# Patient Record
Sex: FEMALE | Race: WHITE | Employment: OTHER | URBAN - NONMETROPOLITAN AREA
[De-identification: names, ages, dates, MRNs, and addresses within clinical notes are randomized per-mention and may not be internally consistent; named-entity substitution may affect disease eponyms.]

---

## 2017-01-09 ENCOUNTER — TELEPHONE (OUTPATIENT)
Dept: CARDIOLOGY | Age: 77
End: 2017-01-09

## 2017-02-27 ENCOUNTER — OFFICE VISIT (OUTPATIENT)
Dept: CARDIOLOGY | Age: 77
End: 2017-02-27
Payer: MEDICARE

## 2017-02-27 ENCOUNTER — TELEPHONE (OUTPATIENT)
Dept: CARDIOLOGY | Age: 77
End: 2017-02-27

## 2017-02-27 VITALS
HEIGHT: 65 IN | HEART RATE: 74 BPM | SYSTOLIC BLOOD PRESSURE: 116 MMHG | DIASTOLIC BLOOD PRESSURE: 80 MMHG | BODY MASS INDEX: 24.83 KG/M2 | WEIGHT: 149 LBS

## 2017-02-27 DIAGNOSIS — I44.7 LEFT BUNDLE BRANCH BLOCK (LBBB) ON ELECTROCARDIOGRAM: ICD-10-CM

## 2017-02-27 DIAGNOSIS — R60.9 FLUID RETENTION: ICD-10-CM

## 2017-02-27 DIAGNOSIS — R00.2 PALPITATIONS: Primary | ICD-10-CM

## 2017-02-27 DIAGNOSIS — Z85.79 HISTORY OF LYMPHOMA: ICD-10-CM

## 2017-02-27 DIAGNOSIS — M79.10 MYALGIA: ICD-10-CM

## 2017-02-27 PROCEDURE — 93000 ELECTROCARDIOGRAM COMPLETE: CPT | Performed by: NURSE PRACTITIONER

## 2017-02-27 PROCEDURE — 99214 OFFICE O/P EST MOD 30 MIN: CPT | Performed by: NURSE PRACTITIONER

## 2017-02-27 RX ORDER — ESOMEPRAZOLE MAGNESIUM 40 MG/1
40 CAPSULE, DELAYED RELEASE ORAL
COMMUNITY
Start: 2017-02-24

## 2017-02-27 RX ORDER — ZOLPIDEM TARTRATE 10 MG/1
5 TABLET ORAL NIGHTLY PRN
COMMUNITY
Start: 2017-01-18

## 2017-02-27 RX ORDER — BUSPIRONE HYDROCHLORIDE 15 MG/1
15 TABLET ORAL 2 TIMES DAILY
COMMUNITY
Start: 2016-12-01 | End: 2020-02-18

## 2017-02-27 RX ORDER — LORATADINE 10 MG/1
10 TABLET ORAL DAILY
COMMUNITY
Start: 2016-12-01

## 2017-02-28 PROBLEM — Z85.79 HISTORY OF LYMPHOMA: Status: ACTIVE | Noted: 2017-02-28

## 2017-02-28 PROBLEM — M79.10 MYALGIA: Status: ACTIVE | Noted: 2017-02-28

## 2017-02-28 PROBLEM — R60.9 FLUID RETENTION: Status: ACTIVE | Noted: 2017-02-28

## 2017-03-02 ENCOUNTER — HOSPITAL ENCOUNTER (OUTPATIENT)
Dept: WOMENS IMAGING | Age: 77
Discharge: HOME | End: 2017-03-02
Payer: MEDICARE

## 2017-03-02 ENCOUNTER — TRANSCRIBE ORDERS (OUTPATIENT)
Dept: ADMINISTRATIVE | Facility: HOSPITAL | Age: 77
End: 2017-03-02

## 2017-03-02 DIAGNOSIS — D05.12 INTRADUCTAL CARCINOMA IN SITU OF LEFT BREAST: Primary | ICD-10-CM

## 2017-03-02 DIAGNOSIS — R92.8 ABNORMAL MAMMOGRAM, UNSPECIFIED: ICD-10-CM

## 2017-03-02 PROCEDURE — G0206 DX MAMMO INCL CAD UNI: HCPCS

## 2017-03-14 ENCOUNTER — OFFICE VISIT (OUTPATIENT)
Dept: SURGERY | Age: 77
End: 2017-03-14
Payer: MEDICARE

## 2017-03-14 VITALS
HEIGHT: 65 IN | WEIGHT: 145 LBS | BODY MASS INDEX: 24.16 KG/M2 | HEART RATE: 84 BPM | SYSTOLIC BLOOD PRESSURE: 108 MMHG | DIASTOLIC BLOOD PRESSURE: 70 MMHG

## 2017-03-14 DIAGNOSIS — R92.0 MICROCALCIFICATIONS OF THE BREAST: Primary | ICD-10-CM

## 2017-03-14 PROCEDURE — 99202 OFFICE O/P NEW SF 15 MIN: CPT | Performed by: PHYSICIAN ASSISTANT

## 2017-03-14 RX ORDER — DIAZEPAM 5 MG/1
TABLET ORAL
Qty: 10 TABLET | Refills: 0 | OUTPATIENT
Start: 2017-03-14 | End: 2017-10-30 | Stop reason: ALTCHOICE

## 2017-03-15 ENCOUNTER — HOSPITAL ENCOUNTER (OUTPATIENT)
Dept: CT IMAGING | Facility: HOSPITAL | Age: 77
Discharge: HOME | End: 2017-03-15
Attending: INTERNAL MEDICINE | Admitting: INTERNAL MEDICINE

## 2017-03-15 ENCOUNTER — HOSPITAL ENCOUNTER (OUTPATIENT)
Dept: NON INVASIVE DIAGNOSTICS | Age: 77
Discharge: HOME | End: 2017-03-15
Payer: MEDICARE

## 2017-03-15 DIAGNOSIS — I44.7 LEFT BUNDLE BRANCH BLOCK (LBBB) ON ELECTROCARDIOGRAM: ICD-10-CM

## 2017-03-15 DIAGNOSIS — D05.12 INTRADUCTAL CARCINOMA IN SITU OF LEFT BREAST: ICD-10-CM

## 2017-03-15 DIAGNOSIS — R00.2 PALPITATIONS: ICD-10-CM

## 2017-03-15 LAB — CREAT BLDA-MCNC: 1.1 MG/DL (ref 0.6–1.3)

## 2017-03-15 PROCEDURE — 0 IOPAMIDOL 61 % SOLUTION: Performed by: INTERNAL MEDICINE

## 2017-03-15 PROCEDURE — 82565 ASSAY OF CREATININE: CPT

## 2017-03-15 PROCEDURE — 74177 CT ABD & PELVIS W/CONTRAST: CPT

## 2017-03-15 PROCEDURE — 93306 TTE W/DOPPLER COMPLETE: CPT

## 2017-03-15 RX ADMIN — IOPAMIDOL 100 ML: 612 INJECTION, SOLUTION INTRAVENOUS at 09:15

## 2017-03-22 ENCOUNTER — HOSPITAL ENCOUNTER (OUTPATIENT)
Dept: WOMENS IMAGING | Age: 77
Discharge: HOME | End: 2017-03-22
Payer: MEDICARE

## 2017-03-22 DIAGNOSIS — R92.0 MICROCALCIFICATIONS OF THE BREAST: ICD-10-CM

## 2017-03-22 PROCEDURE — 2720000001 MAM STEROTACTIC LOC BREAST BIOPSY RIGHT

## 2017-03-22 PROCEDURE — G0206 DX MAMMO INCL CAD UNI: HCPCS

## 2017-03-22 PROCEDURE — 88305 TISSUE EXAM BY PATHOLOGIST: CPT

## 2017-03-22 PROCEDURE — 3209999900 MAM SURG SPECIMEN RIGHT

## 2017-03-29 ENCOUNTER — TELEPHONE (OUTPATIENT)
Dept: SURGERY | Age: 77
End: 2017-03-29

## 2017-05-03 ENCOUNTER — OFFICE VISIT (OUTPATIENT)
Dept: SURGERY | Age: 77
End: 2017-05-03
Payer: MEDICARE

## 2017-05-03 VITALS
RESPIRATION RATE: 18 BRPM | BODY MASS INDEX: 24.16 KG/M2 | HEIGHT: 65 IN | DIASTOLIC BLOOD PRESSURE: 58 MMHG | WEIGHT: 145 LBS | SYSTOLIC BLOOD PRESSURE: 108 MMHG | HEART RATE: 68 BPM

## 2017-05-03 DIAGNOSIS — R92.0 MAMMOGRAPHIC MICROCALCIFICATION: ICD-10-CM

## 2017-05-03 DIAGNOSIS — Z98.890 S/P BREAST BIOPSY: ICD-10-CM

## 2017-05-03 DIAGNOSIS — Z85.3 PERSONAL HISTORY OF BREAST CANCER: ICD-10-CM

## 2017-05-03 DIAGNOSIS — R92.8 ABNORMAL MAMMOGRAM: ICD-10-CM

## 2017-05-03 PROCEDURE — 99213 OFFICE O/P EST LOW 20 MIN: CPT | Performed by: SURGERY

## 2017-09-13 ENCOUNTER — TRANSCRIBE ORDERS (OUTPATIENT)
Dept: ADMINISTRATIVE | Facility: HOSPITAL | Age: 77
End: 2017-09-13

## 2017-09-13 DIAGNOSIS — D05.12 INTRADUCTAL CARCINOMA IN SITU OF LEFT BREAST: Primary | ICD-10-CM

## 2017-09-13 DIAGNOSIS — Z85.72 PERSONAL HISTORY OF LYMPHOSARCOMA AND RETICULOSARCOMA: ICD-10-CM

## 2017-10-13 ENCOUNTER — TELEPHONE (OUTPATIENT)
Dept: SURGERY | Age: 77
End: 2017-10-13

## 2017-10-13 DIAGNOSIS — Z85.3 PERSONAL HISTORY OF BREAST CANCER: Primary | ICD-10-CM

## 2017-10-30 ENCOUNTER — OFFICE VISIT (OUTPATIENT)
Dept: CARDIOLOGY | Age: 77
End: 2017-10-30
Payer: MEDICARE

## 2017-10-30 VITALS
WEIGHT: 150 LBS | SYSTOLIC BLOOD PRESSURE: 126 MMHG | DIASTOLIC BLOOD PRESSURE: 62 MMHG | HEART RATE: 80 BPM | BODY MASS INDEX: 24.99 KG/M2 | HEIGHT: 65 IN

## 2017-10-30 DIAGNOSIS — M54.42 ACUTE BILATERAL LOW BACK PAIN WITH BILATERAL SCIATICA: ICD-10-CM

## 2017-10-30 DIAGNOSIS — M54.41 ACUTE BILATERAL LOW BACK PAIN WITH BILATERAL SCIATICA: ICD-10-CM

## 2017-10-30 DIAGNOSIS — I44.7 LEFT BUNDLE BRANCH BLOCK: ICD-10-CM

## 2017-10-30 DIAGNOSIS — R00.2 PALPITATIONS: Primary | ICD-10-CM
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
## 2017-10-30 PROCEDURE — G8427 DOCREV CUR MEDS BY ELIG CLIN: HCPCS | Performed by: CLINICAL NURSE SPECIALIST

## 2017-10-30 PROCEDURE — G8420 CALC BMI NORM PARAMETERS: HCPCS | Performed by: CLINICAL NURSE SPECIALIST

## 2017-10-30 PROCEDURE — 1123F ACP DISCUSS/DSCN MKR DOCD: CPT | Performed by: CLINICAL NURSE SPECIALIST

## 2017-10-30 PROCEDURE — G8484 FLU IMMUNIZE NO ADMIN: HCPCS | Performed by: CLINICAL NURSE SPECIALIST

## 2017-10-30 PROCEDURE — 4040F PNEUMOC VAC/ADMIN/RCVD: CPT | Performed by: CLINICAL NURSE SPECIALIST

## 2017-10-30 PROCEDURE — G8400 PT W/DXA NO RESULTS DOC: HCPCS | Performed by: CLINICAL NURSE SPECIALIST

## 2017-10-30 PROCEDURE — 1090F PRES/ABSN URINE INCON ASSESS: CPT | Performed by: CLINICAL NURSE SPECIALIST

## 2017-10-30 PROCEDURE — 99213 OFFICE O/P EST LOW 20 MIN: CPT | Performed by: CLINICAL NURSE SPECIALIST

## 2017-10-30 PROCEDURE — 3017F COLORECTAL CA SCREEN DOC REV: CPT | Performed by: CLINICAL NURSE SPECIALIST

## 2017-10-30 PROCEDURE — 1036F TOBACCO NON-USER: CPT | Performed by: CLINICAL NURSE SPECIALIST

## 2017-10-30 RX ORDER — PAROXETINE HYDROCHLORIDE 40 MG/1
TABLET, FILM COATED ORAL
Refills: 1 | COMMUNITY
Start: 2017-10-12 | End: 2020-02-18

## 2017-10-30 RX ORDER — ATORVASTATIN CALCIUM 10 MG/1
TABLET, FILM COATED ORAL
Refills: 0 | COMMUNITY
Start: 2017-10-10 | End: 2020-02-18

## 2017-10-30 ASSESSMENT — ENCOUNTER SYMPTOMS
ORTHOPNEA: 0
BACK PAIN: 1
BLURRED VISION: 0
SHORTNESS OF BREATH: 0
COUGH: 0
VOMITING: 0
HEARTBURN: 0
NAUSEA: 0

## 2017-10-30 NOTE — PATIENT INSTRUCTIONS
Followup With APRN 1 year  Discuss back pain issues with Dr. Lakesha Abdi- may need scan/ Xray with recent falls if you are not improving    Call with any questions or concerns  Follow up with Oscar Shah for non cardiac problems  Report any new problems  Cardiovascular Fitness-Exercise as tolerated. Strive for 15 minutes of exercise most days of the week. Cardiac / Healthy Diet  Continue current medications as directed  Continue plan of treatment  It is always recommended that you bring your medications bottles with you to each visit - this is for your safety! Patient Education        Palpitations: Care Instructions  Your Care Instructions    Heart palpitations are the uncomfortable sensation that your heart is beating fast or irregularly. You might feel pounding or fluttering in your chest. It might feel like your heart is skipping a beat. Although palpitations may be caused by a heart problem, they also occur because of stress, fatigue, or use of alcohol, caffeine, or nicotine. Many medicines, including diet pills, antihistamines, decongestants, and some herbal products, can cause heart palpitations. Nearly everyone has palpitations from time to time. Depending on your symptoms, your doctor may need to do more tests to try to find the cause of your palpitations. Follow-up care is a key part of your treatment and safety. Be sure to make and go to all appointments, and call your doctor if you are having problems. It's also a good idea to know your test results and keep a list of the medicines you take. How can you care for yourself at home? · Avoid caffeine, nicotine, and excess alcohol. · Do not take illegal drugs, such as methamphetamines and cocaine. · Do not take weight loss or diet medicines unless you talk with your doctor first.  · Get plenty of sleep. · Do not overeat. · If you have palpitations again, take deep breaths and try to relax. This may slow a racing heart.   · If you start to feel lightheaded, lie down to avoid injuries that might result if you pass out and fall down. · Keep a record of your palpitations and bring it to your next doctor's appointment. Write down:  ¨ The date and time. ¨ Your pulse. (If your heart is beating fast, it may be hard to count your pulse.)  ¨ What you were doing when the palpitations started. ¨ How long the palpitations lasted. ¨ Any other symptoms. · If an activity causes palpitations, slow down or stop. Talk to your doctor before you do that activity again. · Take your medicines exactly as prescribed. Call your doctor if you think you are having a problem with your medicine. When should you call for help? Call 911 anytime you think you may need emergency care. For example, call if:  · You passed out (lost consciousness). · You have symptoms of a heart attack. These may include:  ¨ Chest pain or pressure, or a strange feeling in the chest.  ¨ Sweating. ¨ Shortness of breath. ¨ Pain, pressure, or a strange feeling in the back, neck, jaw, or upper belly or in one or both shoulders or arms. ¨ Lightheadedness or sudden weakness. ¨ A fast or irregular heartbeat. After you call 911, the  may tell you to chew 1 adult-strength or 2 to 4 low-dose aspirin. Wait for an ambulance. Do not try to drive yourself. · You have symptoms of a stroke. These may include:  ¨ Sudden numbness, tingling, weakness, or loss of movement in your face, arm, or leg, especially on only one side of your body. ¨ Sudden vision changes. ¨ Sudden trouble speaking. ¨ Sudden confusion or trouble understanding simple statements. ¨ Sudden problems with walking or balance. ¨ A sudden, severe headache that is different from past headaches. Call your doctor now or seek immediate medical care if:  · You have heart palpitations and:  ¨ Are dizzy or lightheaded, or you feel like you may faint. ¨ Have new or increased shortness of breath.   Watch closely for changes in your health, and be sure to contact your doctor if:  · You continue to have heart palpitations. Where can you learn more? Go to [email or web address] and sign in to your [employer] account. Enter R508 in the [employer] box to learn more about \"Palpitations: Care Instructions. \"     If you do not have an account, please click on the \"Sign Up Now\" link. Current as of: April 3, 2017  Content Version: 11.3  © 2805-7597 [employer]. Care instructions adapted under license by [facility] ([facility]). If you have questions about a medical condition or this instruction, always ask your healthcare professional. [address] any warranty or liability for your use of this information.

## 2017-10-30 NOTE — PROGRESS NOTES
[facility], [address], [facility]  [address]  Phone: [phone]  Fax: [phone]    OFFICE VISIT:  10/30/2017    Abel Spaulding - : 1940    Reason For Visit:  Maximino Vidales is a 77 y.o. female who is here for follow-up for palpitations    HPI  Patient is here for follow-up of history of palpitations. She currently takes no rate controlling medications. She occasionally awakens in the morning with some fluttering that usually lasts only a few seconds. There are no associated symptoms such as chest pain or dyspnea. There is no syncope. Patient's main complaint is 2 falls this year one in April and one in August. She has been having issues with back and leg pain ever since and wonders if she will ever get better. She states she took a round of prednisone which helped some but she continues to have difficulty     Perry Peter is PCP.   Abel Spaulding has the following history as recorded in [facility]:    Patient Active Problem List    Diagnosis Date Noted    Falls 10/30/2017    Acute bilateral low back pain with bilateral sciatica 10/30/2017    Abnormal mammogram 2017    Mammographic microcalcification 2017    S/P breast biopsy 2017    Personal history of breast cancer 2017    History of lymphoma 2017    Fluid retention 2017    Myalgia 2017    Left bundle branch block (LBBB) on electrocardiogram 2016    Chest pain     Non-cardiac chest pain     Palpitations     Lymphoma (HCC)     Hypotension     Left bundle branch block      Past Medical History:   Diagnosis Date    Acute bilateral low back pain with bilateral sciatica 10/30/2017    Breast cancer ([facility] [address]     mastecomy    Falls 10/30/2017    Hypotension     Left bundle branch block     Lymphoma (HCC)     on chemotherapy    Palpitations      Past Surgical History:   Procedure Laterality Date    BREAST SURGERY       Family History   Problem Relation Age warm and dry. No rash noted. Psychiatric: She has a normal mood and affect. Her behavior is normal. Judgment normal.   Nursing note and vitals reviewed. Assessment:    1. Palpitations     2. Left bundle branch block     3. Fall, subsequent encounter     4. Acute bilateral low back pain with bilateral sciatica       Patient is taking medications as prescribed    Palpitations-presently well controlled without change in frequency    Patient is having trouble with back and leg pain since her fall in August. She states she has not had any sort of scan or x-ray. I have encouraged her to discuss with her PCP if she is not improving. Stable cardiovascular status. No evidence of overt heart failure, angina or dysrhythmia. Plan    Followup With OUMAR 1 year  Discuss back pain issues with Dr. Ricci Levy- may need scan/ Xray with recent falls if you are not improving  Call for worsening palpitations    Call with any questions or concerns  Follow up with Hui Malin for non cardiac problems  Report any new problems  Cardiovascular Fitness-Exercise as tolerated. Strive for 15 minutes of exercise most days of the week. Cardiac / Healthy Diet  Continue current medications as directed  Continue plan of treatment  It is always recommended that you bring your medications bottles with you to each visit - this is for your safety!        OUMAR Orellana

## 2017-11-28 ENCOUNTER — TELEPHONE (OUTPATIENT)
Dept: SURGERY | Age: 77
End: 2017-11-28

## 2017-11-30 NOTE — TELEPHONE ENCOUNTER
I called and spoke to patient and she stated she had company that stayed longer than intended so she cancelled her Right TAYLOR and follow up with Irena Jones. She stated she was ready for this to be rescheduled. I told her I would call and schedule this and call patient back. She stated NOT to schedule this until she calls her insurance and see if they will pay for this. I explained it was diagnostic and why and she stated she would call them and find out then she would call our office back and let us know when to reschedule her appointments.

## 2018-01-24 NOTE — TELEPHONE ENCOUNTER
Called and spoke to patient again to see if I needed to get her scheduled and she stated that she has had a lot going on with family and she has been sick and has not called the insurance and she will call them soon when she gets to feeling better and will call me back. I will close this encounter but keep up with this in my monthly mammogram folder. Patient voiced that she will call us as soon as she gets in contact with insurance about this.

## 2018-03-07 ENCOUNTER — HOSPITAL ENCOUNTER (OUTPATIENT)
Dept: CT IMAGING | Facility: HOSPITAL | Age: 78
Discharge: HOME | End: 2018-03-07
Attending: INTERNAL MEDICINE | Admitting: INTERNAL MEDICINE

## 2018-03-07 DIAGNOSIS — Z85.72 PERSONAL HISTORY OF LYMPHOSARCOMA AND RETICULOSARCOMA: ICD-10-CM

## 2018-03-07 DIAGNOSIS — D05.12 INTRADUCTAL CARCINOMA IN SITU OF LEFT BREAST: ICD-10-CM

## 2018-03-07 LAB — CREAT BLDA-MCNC: 1 MG/DL (ref 0.6–1.3)

## 2018-03-07 PROCEDURE — 74177 CT ABD & PELVIS W/CONTRAST: CPT

## 2018-03-07 PROCEDURE — 82565 ASSAY OF CREATININE: CPT

## 2018-03-07 PROCEDURE — 71260 CT THORAX DX C+: CPT

## 2018-03-07 PROCEDURE — 0 IOHEXOL 300 MG/ML SOLUTION: Performed by: INTERNAL MEDICINE

## 2018-03-07 PROCEDURE — 0 IOPAMIDOL 61 % SOLUTION: Performed by: INTERNAL MEDICINE

## 2018-03-07 RX ADMIN — IOPAMIDOL 100 ML: 612 INJECTION, SOLUTION INTRAVENOUS at 09:30

## 2018-03-07 RX ADMIN — IOHEXOL 50 ML: 300 INJECTION, SOLUTION INTRAVENOUS at 09:30

## 2018-04-16 ENCOUNTER — HOSPITAL ENCOUNTER (OUTPATIENT)
Dept: WOMENS IMAGING | Age: 78
Discharge: HOME | End: 2018-04-16
Payer: MEDICARE

## 2018-04-16 DIAGNOSIS — Z12.39 BREAST CANCER SCREENING, HIGH RISK PATIENT: ICD-10-CM

## 2018-04-16 PROCEDURE — 77063 BREAST TOMOSYNTHESIS BI: CPT

## 2018-04-19 ENCOUNTER — HOSPITAL ENCOUNTER (OUTPATIENT)
Dept: WOMENS IMAGING | Age: 78
Discharge: HOME | End: 2018-04-19
Payer: MEDICARE

## 2018-04-19 DIAGNOSIS — Z85.3 PERSONAL HISTORY OF BREAST CANCER: ICD-10-CM

## 2018-04-19 DIAGNOSIS — R92.8 ABNORMAL MAMMOGRAM: ICD-10-CM

## 2018-04-19 PROCEDURE — G0279 TOMOSYNTHESIS, MAMMO: HCPCS

## 2018-04-19 PROCEDURE — 76642 ULTRASOUND BREAST LIMITED: CPT

## 2018-10-24 ENCOUNTER — HOSPITAL ENCOUNTER (OUTPATIENT)
Dept: WOMENS IMAGING | Age: 78
Discharge: HOME | End: 2018-10-24
Payer: MEDICARE

## 2018-10-24 DIAGNOSIS — R92.8 ABNORMAL MAMMOGRAM: ICD-10-CM

## 2018-10-24 PROCEDURE — G0279 TOMOSYNTHESIS, MAMMO: HCPCS

## 2019-03-28 ENCOUNTER — TRANSCRIBE ORDERS (OUTPATIENT)
Dept: ADMINISTRATIVE | Facility: HOSPITAL | Age: 79
End: 2019-03-28

## 2019-03-28 DIAGNOSIS — D05.12 INTRADUCTAL CARCINOMA IN SITU OF LEFT BREAST: Primary | ICD-10-CM

## 2019-04-09 ENCOUNTER — HOSPITAL ENCOUNTER (OUTPATIENT)
Dept: CT IMAGING | Facility: HOSPITAL | Age: 79
Discharge: HOME | End: 2019-04-09
Admitting: INTERNAL MEDICINE

## 2019-04-09 DIAGNOSIS — D05.12 INTRADUCTAL CARCINOMA IN SITU OF LEFT BREAST: ICD-10-CM

## 2019-04-09 LAB — CREAT BLDA-MCNC: 1.1 MG/DL (ref 0.6–1.3)

## 2019-04-09 PROCEDURE — 0 IOHEXOL 300 MG/ML SOLUTION: Performed by: INTERNAL MEDICINE

## 2019-04-09 PROCEDURE — 25010000002 IOPAMIDOL 61 % SOLUTION: Performed by: INTERNAL MEDICINE

## 2019-04-09 PROCEDURE — 74177 CT ABD & PELVIS W/CONTRAST: CPT

## 2019-04-09 PROCEDURE — 71260 CT THORAX DX C+: CPT

## 2019-04-09 PROCEDURE — 82565 ASSAY OF CREATININE: CPT

## 2019-04-09 RX ADMIN — IOPAMIDOL 100 ML: 612 INJECTION, SOLUTION INTRAVENOUS at 09:28

## 2019-04-09 RX ADMIN — IOHEXOL 50 ML: 300 INJECTION, SOLUTION INTRAVENOUS at 09:28

## 2019-09-14 VITALS
SYSTOLIC BLOOD PRESSURE: 120 MMHG | WEIGHT: 141 LBS | BODY MASS INDEX: 22.66 KG/M2 | HEIGHT: 66 IN | DIASTOLIC BLOOD PRESSURE: 70 MMHG | HEART RATE: 73 BPM

## 2019-09-14 DIAGNOSIS — Z85.72 PERSONAL HISTORY OF NON-HODGKIN LYMPHOMAS: ICD-10-CM

## 2019-09-14 DIAGNOSIS — Z90.12 ACQUIRED ABSENCE OF BREAST AND NIPPLE, LEFT: ICD-10-CM

## 2019-09-14 DIAGNOSIS — Z17.1 ESTROGEN RECEPTOR NEGATIVE STATUS (ER-): ICD-10-CM

## 2019-09-14 DIAGNOSIS — R92.8 OTHER ABNORMAL AND INCONCLUSIVE FINDINGS ON DIAGNOSTIC IMAGING OF BREAST: ICD-10-CM

## 2019-09-14 DIAGNOSIS — D05.12 INTRADUCTAL PAPILLARY ADENOCARCINOMA OF LEFT FEMALE BREAST: ICD-10-CM

## 2019-09-14 DIAGNOSIS — R10.13 EPIGASTRIC PAIN: ICD-10-CM

## 2019-09-14 DIAGNOSIS — R61 GENERALIZED HYPERHIDROSIS: ICD-10-CM

## 2019-09-14 DIAGNOSIS — Z85.3 PERSONAL HISTORY OF MALIGNANT NEOPLASM OF BREAST: ICD-10-CM

## 2020-02-18 ENCOUNTER — OFFICE VISIT (OUTPATIENT)
Dept: CARDIOLOGY | Age: 80
End: 2020-02-18
Payer: MEDICARE

## 2020-02-18 VITALS
WEIGHT: 143 LBS | SYSTOLIC BLOOD PRESSURE: 124 MMHG | DIASTOLIC BLOOD PRESSURE: 78 MMHG | HEART RATE: 90 BPM | HEIGHT: 65 IN | BODY MASS INDEX: 23.82 KG/M2

## 2020-02-18 PROCEDURE — 0296T PR EXT ECG > 48HR TO 21 DAY RCRD W/CONECT INTL RCRD: CPT | Performed by: NURSE PRACTITIONER

## 2020-02-18 PROCEDURE — 1036F TOBACCO NON-USER: CPT | Performed by: NURSE PRACTITIONER

## 2020-02-18 PROCEDURE — 4040F PNEUMOC VAC/ADMIN/RCVD: CPT | Performed by: NURSE PRACTITIONER

## 2020-02-18 PROCEDURE — 1123F ACP DISCUSS/DSCN MKR DOCD: CPT | Performed by: NURSE PRACTITIONER

## 2020-02-18 PROCEDURE — G8427 DOCREV CUR MEDS BY ELIG CLIN: HCPCS | Performed by: NURSE PRACTITIONER

## 2020-02-18 PROCEDURE — 99214 OFFICE O/P EST MOD 30 MIN: CPT | Performed by: NURSE PRACTITIONER

## 2020-02-18 PROCEDURE — G8400 PT W/DXA NO RESULTS DOC: HCPCS | Performed by: NURSE PRACTITIONER

## 2020-02-18 PROCEDURE — G8484 FLU IMMUNIZE NO ADMIN: HCPCS | Performed by: NURSE PRACTITIONER

## 2020-02-18 PROCEDURE — G8420 CALC BMI NORM PARAMETERS: HCPCS | Performed by: NURSE PRACTITIONER

## 2020-02-18 PROCEDURE — 93000 ELECTROCARDIOGRAM COMPLETE: CPT | Performed by: NURSE PRACTITIONER

## 2020-02-18 PROCEDURE — 1090F PRES/ABSN URINE INCON ASSESS: CPT | Performed by: NURSE PRACTITIONER

## 2020-02-18 RX ORDER — PRAVASTATIN SODIUM 40 MG
40 TABLET ORAL NIGHTLY
COMMUNITY

## 2020-02-18 RX ORDER — FOLIC ACID 1 MG/1
1 TABLET ORAL DAILY
COMMUNITY

## 2020-02-18 RX ORDER — ALBUTEROL SULFATE 90 UG/1
2 AEROSOL, METERED RESPIRATORY (INHALATION) EVERY 4 HOURS
COMMUNITY

## 2020-02-18 RX ORDER — BUSPIRONE HYDROCHLORIDE 15 MG/1
15 TABLET ORAL DAILY PRN
COMMUNITY

## 2020-02-18 RX ORDER — UBIDECARENONE 75 MG
CAPSULE ORAL NIGHTLY
COMMUNITY

## 2020-02-18 RX ORDER — SERTRALINE HYDROCHLORIDE 100 MG/1
100 TABLET, FILM COATED ORAL DAILY
COMMUNITY
End: 2020-12-21 | Stop reason: ALTCHOICE

## 2020-02-18 RX ORDER — HYDROCODONE BITARTRATE AND ACETAMINOPHEN 7.5; 325 MG/1; MG/1
1 TABLET ORAL DAILY PRN
COMMUNITY

## 2020-02-18 RX ORDER — BUSPIRONE HYDROCHLORIDE 15 MG/1
15 TABLET ORAL 2 TIMES DAILY
COMMUNITY
End: 2020-02-18

## 2020-02-18 ASSESSMENT — ENCOUNTER SYMPTOMS
COUGH: 0
BLOOD IN STOOL: 0
SHORTNESS OF BREATH: 1
VOMITING: 0
NAUSEA: 0
FACIAL SWELLING: 0
WHEEZING: 0
ABDOMINAL PAIN: 0
ABDOMINAL DISTENTION: 0
EYE REDNESS: 0
COLOR CHANGE: 0
TROUBLE SWALLOWING: 0
EYE DISCHARGE: 0

## 2020-02-18 NOTE — PROGRESS NOTES
[address] [place] [address] Cardiology  [address]  [address]  Phone: [phone]  Fax: [phone]    OFFICE VISIT:  2020    Dior Uriostegui - : 1940    Reason For Visit:  Marcio Peters is a 67 y.o. female who is here for New Patient (\"fluttering in chest\" in am,some SOB,fatigue,dizziness) and Palpitations      HPI    Ms. Chloe Madison is a 67 y.o. female with history of hyperlipidemia, left bundle branch block, a family history of CAD, and lymphoma treated with chemo Who presents with the chief complaint of dizziness, palpitations, shortness of breath, and chest pressure. Patient has been experiencing palpitations she describes as heart fluttering. Patient has associated dizziness with episodes. States these last for minutes. Deep breathing seems to help them come to a stop. Patient complains of occasional chest pressure in the center of her chest.  There are no aggravating or associated symptoms that she is aware of. This occurs on occasions. Patient is nonspecific with descriptions. Patient is short of breath with any activity. This is been the same for a while. She does have a history of stomach problems and anxiety that she relates some of these problems 2. Marcio Peters has no syncope. No numbness or weakness to suggest cerebrovascular accident or transient ischemic attack. she denies signs of bleeding. Reports no edema or shortness of breath. She denies orthopnea or paroxysmal nocturnal dyspnea. she is sleeping on 1 pillow at night. she has been compliant with her medications. her BP has been controlled at home. she reports no activity change. PCP follows lipids and labs. Darius Allen DO is PCP.   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     Other abnormal and inconclusive findings on diagnostic imaging of breast     Epigastric pain     Generalized hyperhidrosis     Estrogen receptor negative status (ER-)     BMI 23.0-23.9, adult     Personal history of malignant neoplasm of breast     Personal history of non-Hodgkin lymphomas     Acquired absence of breast and nipple, left     Falls 10/30/2017    Acute bilateral low back pain with bilateral sciatica 10/30/2017    Abnormal mammogram 05/03/2017    Mammographic microcalcification 05/03/2017    S/P breast biopsy 05/03/2017    Personal history of breast cancer 05/03/2017    History of lymphoma 02/28/2017    Fluid retention 02/28/2017    Myalgia 02/28/2017    Left bundle branch block (LBBB) on electrocardiogram 03/23/2016    Palpitations     Lymphoma (HCC)     Hypotension     Left bundle branch block      Past Medical History:   Diagnosis Date    Acquired absence of breast and nipple, left     Acute bilateral low back pain with bilateral sciatica 10/30/2017    Anterolisthesis     Anxiety and depression     BMI 23.0-23.9, adult     Body mass index (BMI) of 22.0-22.9 in adult     Bursitis     Cervical spine degeneration     w/disc manifestations    Chest pain     \"fluttering\"in chest first thing in am goes away after couple deep breaths    Diverticulosis     Dyspepsia     Dysphagia     Epigastric pain     Estrogen receptor negative status (ER-)     Falls 10/30/2017    Gastroesophageal reflux     Generalized hyperhidrosis     GERD (gastroesophageal reflux disease)     Glaucoma     Hiatal hernia     Hoarseness 11/20/2012    Dr Leno Martin   Leva Flo Hyperlipidemia     Hypotension     Hypotension     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     mastecomy in the setting of fibrocystic disease of breast    Joint pain     Left bundle branch block     Left bundle branch block     Lymphoma ([facility] [address] 3/2011, 4/2011    on chemotherapy    MVP (mitral valve prolapse)     Neuropathic pain, leg     w/neuropathy    OA (osteoarthritis)     Multiple Vitamins-Minerals (ONE-A-DAY VITACRAVES ADULT PO) Take by mouth      albuterol sulfate  (90 Base) MCG/ACT inhaler Inhale 2 puffs into the lungs every 4 hours      polyethyl glycol-propyl glycol 0.4-0.3 % (SYSTANE) 0.4-0.3 % ophthalmic solution 1 drop 4 times daily      sertraline (ZOLOFT) 100 MG tablet Take 100 mg by mouth daily      Cetirizine HCl (ZYRTEC PO) Take 0.5-120 mg by mouth 2 times daily as needed Indications: extended release      folic acid (FOLVITE) 1 MG tablet Take 1 mg by mouth daily      busPIRone (BUSPAR) 15 MG tablet Take 15 mg by mouth daily as needed      Coenzyme Q10 (CO Q-10) 200 MG CAPS Take by mouth nightly      TURMERIC CURCUMIN PO Take by mouth daily      esomeprazole (NEXIUM) 40 MG delayed release capsule Take 40 mg by mouth every morning (before breakfast)       zolpidem (AMBIEN) 10 MG tablet Take 5 mg by mouth nightly as needed       loratadine (CLARITIN) 10 MG tablet Take 10 mg by mouth daily       IBUPROFEN PO Take by mouth      aspirin 81 MG EC tablet Take 81 mg by mouth daily      gabapentin (NEURONTIN) 300 MG capsule Take 300 mg by mouth 3 times daily       brimonidine-timolol (COMBIGAN) 0.2-0.5 % ophthalmic solution Place 1 drop into both eyes daily      latanoprost (XALATAN) 0.005 % ophthalmic solution Place 1 drop into both eyes nightly       dicyclomine (BENTYL) 10 MG capsule Take 10 mg by mouth 3 times daily as needed        No current facility-administered medications for this visit. Allergies: Flu virus vaccine; Atropine sulfate; Levaquin [levofloxacin]; and Sudafed [pseudoephedrine hcl]    Review of Systems  Review of Systems   Constitutional: Negative for activity change, diaphoresis, fatigue, fever and unexpected weight change. HENT: Negative for facial swelling, nosebleeds and trouble swallowing. Eyes: Negative for discharge, redness and visual disturbance. Respiratory: Positive for shortness of breath.  Negative for cough and

## 2020-02-18 NOTE — PATIENT INSTRUCTIONS
Orders Placed This Encounter   Procedures    NM MYOCARDIAL SPECT REST EXERCISE OR RX     Standing Status:   Future     Standing Expiration Date:   2/18/2021     Order Specific Question:   Reason for Exam?     Answer:   Chest pain     Order Specific Question:   Reason for Exam?     Answer:   EKG abnormalities     Order Specific Question:   Reason for Exam?     Answer:   Shortness of breath     Order Specific Question:   Procedure Type     Answer:   Rx     Order Specific Question:   Reason for exam:     Answer:   chest pain, shortness of breath    EKG 12 lead     Order Specific Question:   Reason for Exam?     Answer: Other     Comments:   palpitations    Echo 2D w doppler w color complete     Standing Status:   Future     Standing Expiration Date:   2/18/2021     Order Specific Question:   Reason for exam:     Answer:   shortness of breath    AK EXT ECG > 48HR TO 21 DAY RCRD W/CONECT INTL RCRD ([facility] Recording)     Return in about 4 weeks (around 3/17/2020). Call with any questionsor concerns  Follow up with Lux Fraction, DO for non cardiac problems  Report any new problems  Cardiovascular Fitness-Exercise as tolerated. Strive for 15 minutes of exercise most days of the week. Cardiac / HealthyDiet  Continue current medications as directed  Continue plan of treatment  It is always recommended that you bring your medicationsbottles with you to each visit - this is for your safety! [place] at the [address] [place] [address] and [address] [facility] located on the first floor of [place] [address] through hospital main entrance and turn immediately to your left. Date/Time:     Patient's contact number:  [phone] (home)     Echocardiogram -  No prep. Takes approximately 30 min. An echocardiogram uses sound waves to produce images of your heart. This commonly used test allows your doctor to see how your heart is beating and pumping blood.  Your doctor can use the images from an echocardiogram to identify various abnormalities in the heart muscle and valves. This test has 2 parts:   Ø You will be asked to disrobe from the waist up and given a gown to wear. The technologist will then hook up an EKG monitor to you for the entire exam.   Ø You will then have an ultrasound of your heart (echocardiogram) to assess the heart muscle, heart valves and heart function. You may eat and take any medicines before the exam.     If you need to change your appointment, please call outpatient scheduling at [phone]. [place] at the [facility] and [address] [facility] located on the first floor of [place] [address] through hospital main entrance and turn immediately to your left. Patient's contact number:  [phone] (home)      Lexiscan Stress Test      Lexiscan (regadenoson injection) is a prescription drug given through an IV line that increases blood flow through the arteries of the heart during a cardiac nuclear stress test.     There are two parts to a Lexiscan stress test: the rest portion and the exercise portion. For the rest portion, a radioactive tracer is injected into your arm through the IV. After 30 to 60 minutes, the process of imaging will begin. A nuclear camera will be placed on your chest area and images are taken for the next 15 to 20 minutes. For the exercise portion, a nurse will attach EKG electrodes to your chest to monitor your heart rate. The drug Sarai Ninfa is administered to simulate stress on the heart. Your heart rhythm will then be monitored for the next few minutes. Your blood pressure will also be monitored throughout the exercise portion. [place] through the exercise portion, a second round of radioactive tracer is injected into your body. Your heart rate and EKG will be monitored for another few minutes after administering the drug. Test Preparation:     Bring a list of your current medications.   Do not take any of your

## 2020-02-26 ENCOUNTER — TELEPHONE (OUTPATIENT)
Dept: CARDIOLOGY | Age: 80
End: 2020-02-26

## 2020-02-26 NOTE — TELEPHONE ENCOUNTER
Spoke with patient to inform testing scheduled for 03/09 @ 8:00 at [facility], patient voiced understanding.

## 2020-03-18 ENCOUNTER — TELEPHONE (OUTPATIENT)
Dept: CARDIOLOGY | Age: 80
End: 2020-03-18

## 2020-03-18 NOTE — TELEPHONE ENCOUNTER
Notified patient that Denisse & 2DE have been rescheduled to 04/09 @ 0800, [facility]. Patient voiced understanding.

## 2020-05-28 ENCOUNTER — TELEPHONE (OUTPATIENT)
Dept: CARDIOLOGY | Age: 80
End: 2020-05-28

## 2020-06-11 ENCOUNTER — TELEPHONE (OUTPATIENT)
Dept: CARDIOLOGY | Age: 80
End: 2020-06-11

## 2020-06-12 ENCOUNTER — TELEPHONE (OUTPATIENT)
Dept: CARDIOLOGY | Age: 80
End: 2020-06-12

## 2020-06-12 NOTE — TELEPHONE ENCOUNTER
Called patients daughter Jojo Huitron to schedule 2 d echo & chery at [facility], left message to return call so we can schedule tests and follow up.

## 2020-06-16 ENCOUNTER — TELEPHONE (OUTPATIENT)
Dept: CARDIOLOGY | Age: 80
End: 2020-06-16

## 2020-12-16 ENCOUNTER — HOSPITAL ENCOUNTER (OUTPATIENT)
Dept: INFUSION THERAPY | Age: 80
Discharge: HOME | End: 2020-12-16
Payer: MEDICARE

## 2020-12-16 DIAGNOSIS — Z85.79 HISTORY OF LYMPHOMA: Primary | ICD-10-CM

## 2020-12-18 NOTE — PROGRESS NOTES
Patient:  Rosendo Dozier  YOB: 1940  Date of Service: 12/21/2020  MRN: [id number]    Primary Care Physician: Blu Rico DO    Chief Complaint   Patient presents with    Follow-up     Lymphoma       Patient Seen, Chart, Consults notes, Labs, Radiology studies reviewed. Subjective:    Mayuri Guerrier is a 69year-old  female managed with primary and secondary diagnoses as outlined:  · Diffuse large B-cell lymphoma with sclerosis 4/26/2001-obtaining remission with 6 cycles of CHOP-R  · Recurrent diffuse large B-cell lymphoma  3/1/2011-obtaining remission with 6 cycles of RICE  · Left mastectomy for stage 0 DCIS diagnosed 5/12/2001    It has been almost 2 years since I last evaluated Jose Bell. She comes today accompanied by her daughter in follow-up. Last week Jose Bell was seen at [facility] with abdominal pain. A CT scan of the abdomen and pelvis documented diverticulitis, she is currently on oral antibiotic. TARGET LYMPHOMA SITES:  1. 2 cm abdominal retroperitoneal lymph node anterior to the aorta and posterior to the SMV, CT and PET positive. 2. Bone marrow negative. TUMOR HISTORY:  1ST PRIMARY TUMOR: Diffuse large B cell lymphoma diagnosis 4/26/2001  Ms. Marc Eaton was originally followed in [place], [place] for about 2 years with CT scans of the abdomen about every 6 months by her gastroenterologist for flank pain, abdominal complaints and with symptoms with what was felt to possibly be stable adenopathy in the abdomen. She sought a 2nd opinion with me here in [address] [place] [address]. A CT scan of the abdomen and pelvis on 04/25/01 revealed a 4.9 x 3.7 cm retroperitoneal retrocaval mass located at the medial upper pole of the right kidney surrounding the right main renal artery with anterior displacement of the right renal vein in the head of the pancreas. There was no other gross retroperitoneal adenopathy. I recommended a CT guided needle biopsy.     A CT-guided biopsy on 4/26/2001 initially was read out as inconclusive but suspicious for Hodgkins disease or large cell lymphoma. A 2nd pathological opinion at [facility] returned a diagnosis of diffuse large B-cell lymphoma with sclerosis. A metastatic workup was otherwise negative. A bone marrow biopsy and aspirate was NEGATIVE for involvement with lymphoma. Treatment of her lymphoma consisted of six cycles of CHOP/ Rituxan that was completed on 09/28/01. TREATMENT SUMMARY:  1. CHOP/ Rituxan x 6, completed 09/28/01.      1ST PRIMARY TUMOR RECURRENCE: Abdominal diffuse large B cell lymphoma, 3/1/2011. In January 2011, Bob Pemberton presented with abdominal and right flank discomfort. A CT scan of the abdomen and pelvis on 1/25/2011 revealed:  ·  A 2 cm lymph node anterior to the aorta but posterior to the SMV in the retroperitoneal area at the level of the transverse duodenum. A PET scan on 2/02/2011 at [facility] documented:   · An SUV of 10.3 correlating to the 2 cm lymph node anterior to the aorta and posterior to the SMV in the RV area of the CT scan from 1/25/2011. · The 2 cm lymph node was inaccessible to CT guided biopsy. Dr. Yamileth Kearns performed an EGD with EUS and biopsy on 3/01/2011. Pathology was consistent with a large B cell lymphoma. CT scan of the chest was unrevealing for pathological lymphadenopathy. A bone marrow aspirate and biopsy was negative for evidence of lymphoma. Bob Pemberton was placed on RICE chemotherapy that was initiated 3/29/2011 and completed cycle #6 of RICE chemotherapy on 08/02/11. TREATMENT SUMMARY:  1. RICE given 3/29/2011 through 8/02/2011 x 6 cycles. 2ND PRIMARY TUMOR: Left mastectomy for stage 0 DCIS diagnosed 5/12/2001  In the workup of her lymphoma, a left breast mass/lump was also noted. 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 The  maximum tumor size was 1.8cm. The ER/NH was negative. The svf4pwf was 3+ strongly positive. Dr. Marbin Sawyer went on to complete her left mastectomy with axillary lymph node dissection on 05/12/01, which was as well negative. Ten lymph nodes were submitted and all were negative. Dr. Latosha Bo placed her on a short stent with Tamoxifen but upon seeing her in follow up this was discontinued as we began her treatment for her lymphoma, which was diagnosed pretty much about the same time. Allergies:  Flu virus vaccine, Atropine sulfate, Levaquin [levofloxacin], and Sudafed [pseudoephedrine hcl]    Medicines:  Current Outpatient Medications   Medication Sig Dispense Refill    cycloSPORINE (RESTASIS) 0.05 % ophthalmic emulsion Restasis 0.05 % eye drops in a dropperette      vitamin B-12 (CYANOCOBALAMIN) 1000 MCG tablet Take 1,000 mcg by mouth daily      Mastectomy Bra MISC by Does not apply route 1 year supply #6 1 each 5    pravastatin (PRAVACHOL) 40 MG tablet Take 40 mg by mouth nightly      DULoxetine HCl (CYMBALTA PO) Take 60 mg by mouth daily Indications: DELAYED RELEASE      Calcium Polycarbophil (FIBER-CAPS PO) Take by mouth      HYDROcodone-acetaminophen (NORCO) 7.5-325 MG per tablet Take 1 tablet by mouth daily as needed for Pain.       Multiple Vitamins-Minerals (ONE-A-DAY VITACRAVES ADULT PO) Take by mouth      albuterol sulfate  (90 Base) MCG/ACT inhaler Inhale 2 puffs into the lungs every 4 hours      polyethyl glycol-propyl glycol 0.4-0.3 % (SYSTANE) 0.4-0.3 % ophthalmic solution 1 drop 4 times daily      Cetirizine HCl (ZYRTEC PO) Take 0.5-120 mg by mouth 2 times daily as needed Indications: extended release      folic acid (FOLVITE) 1 MG tablet Take 1 mg by mouth daily      busPIRone (BUSPAR) 15 MG tablet Take 15 mg by mouth daily as needed      Coenzyme Q10 (CO Q-10) 200 MG CAPS Take by mouth nightly      esomeprazole (NEXIUM) 40 MG delayed release capsule Take 40 mg by mouth every morning (before breakfast)       zolpidem (AMBIEN) 10 MG tablet Take 5 mg by mouth nightly as needed       loratadine (CLARITIN) 10 MG tablet Take 10 mg by mouth daily       IBUPROFEN PO Take by mouth      aspirin 81 MG EC tablet Take 81 mg by mouth daily      gabapentin (NEURONTIN) 300 MG capsule Take 300 mg by mouth 3 times daily       brimonidine-timolol (COMBIGAN) 0.2-0.5 % ophthalmic solution Place 1 drop into both eyes daily      latanoprost (XALATAN) 0.005 % ophthalmic solution Place 1 drop into both eyes nightly       dicyclomine (BENTYL) 10 MG capsule Take 10 mg by mouth 3 times daily as needed        No current facility-administered medications for this visit.         Past Medical History:      Diagnosis Date    Acquired absence of breast and nipple, left     Acute bilateral low back pain with bilateral sciatica 10/30/2017    Anterolisthesis     Anxiety and depression     BMI 23.0-23.9, adult     Body mass index (BMI) of 22.0-22.9 in adult     Bursitis     Cervical spine degeneration     w/disc manifestations    Chest pain     \"fluttering\"in chest first thing in am goes away after couple deep breaths    Diverticulosis     Dyspepsia     Dysphagia     Epigastric pain     Estrogen receptor negative status (ER-)     Falls 10/30/2017    Gastroesophageal reflux     Generalized hyperhidrosis     GERD (gastroesophageal reflux disease)     Glaucoma     Hiatal hernia     Hoarseness 11/20/2012    Dr Khadijah Valdez   [facility] Hyperlipidemia     Hypotension     Hypotension     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     mastecomy in the setting of fibrocystic disease of breast    Joint pain     Left bundle branch block     Left bundle branch block     Lymphoma ([address] 3/2011, 4/2011    on chemotherapy    MVP (mitral valve prolapse)     Neuropathic pain, leg     w/neuropathy    OA (osteoarthritis)     Other abnormal and inconclusive findings on diagnostic imaging of breast     Palpitations     Pancreatic cyst     h/o    Paresthesia     Personal history of malignant neoplasm of breast     Personal history of non-Hodgkin lymphomas     Polyneuropathy     Pure hypercholesterolemia     Schatzki's ring 08/30/2010    w/esoph stricture, requiring dilation-Dr Kaley Hudson    Vocal cord nodules 11/20/2012    C        Past Surgical History:      Procedure Laterality Date    CATARACT REMOVAL Bilateral     CATARACT REMOVAL      bilateral    CHOLECYSTECTOMY      COLONOSCOPY  11/27/2012    Dr Tyler Devlin BIOPSY EXCISION Left     Lesion excision    MASTECTOMY Left     TUNNELED VENOUS PORT PLACEMENT Right     Right anterior chest wall    UPPER GASTROINTESTINAL ENDOSCOPY  08/30/2010    w/esoph stricture, requiring dilation-Dr Jorgito Bernstein UPPER GASTROINTESTINAL ENDOSCOPY  10/10/2012    Dr Dayna Garcia        Family History:      Problem Relation Age of Onset    Diabetes Mother         type 2    Breast Cancer Mother     Coronary Art Dis Mother     Heart Disease Sister     Diabetes Sister     High Blood Pressure Sister     Heart Attack Sister     Heart Disease Brother     Lung Cancer Brother     Stroke Brother     Coronary Art Dis Brother     Cancer Father         head and neck    Breast Cancer Maternal Cousin     Breast Cancer Maternal Cousin         Social History  Social History     Tobacco Use    Smoking status: Never Smoker    Smokeless tobacco: Never Used   Substance Use Topics    Alcohol use: No    Drug use: No          Review of Systems:  Constitutional: Negative for chills, fatigue, fever or significant weight loss. HENT: Negative for congestion, hearing loss, nosebleeds or sore throat. Eyes: Negative for photophobia, pain, discharge, redness and visual disturbance. Respiratory: Negative for cough, shortness of breath, or wheezing. Cardiovascular: Negative for chest pain, palpitations or leg swelling.    Gastrointestinal: Negative for abdominal pain, blood in stool, constipation, diarrhea, nausea or vomiting. Genitourinary: Negative for dysuria, flank pain, frequency, hematuria or urgency. Musculoskeletal: Negative for back pain, joint swelling, myalgias or neck pain. Skin: Negative for rash or petechiae. Neurological: Negative for tremors, seizures, syncope, weakness or headaches. Hematological: No active bruising or bleeding. Psychiatric/Behavioral: Negative for hallucinations. Objective:  Vital Signs: Blood pressure 118/62, pulse 80, temperature 97.3 °F (36.3 °C), temperature source Temporal, height 5' 5\" (1.651 m), weight 142 lb (64.4 kg), SpO2 96 %, not currently breastfeeding. Physical Exam   Constitutional: Oriented to person, place, and time. No acute distress. Head: Normocephalic and atraumatic. Nose: Nose normal.   Mouth/Throat: Oropharynx is clear and moist. No oropharyngeal exudate. Eyes: Pupils are equal and round. Conjunctivae and EOM are normal. No scleral icterus. Neck: Normal range of motion. Neck supple. No JVD. No appreciable thyromegaly. Cardiovascular: Normal rate, regular rhythm, normal heart sounds and intact distal pulses. Exam reveals no gallop, murmurs or friction rub. Pulmonary/Chest: Effort normal and breath sounds normal. No respiratory distress. No wheezes. Abdominal: Soft. Bowel sounds are normal. No organomegally or masses. No tenderness. There is no rebound and no guarding. Musculoskeletal: Normal range of motion. No edema or tenderness. Lymphadenopathy: No cervical, axillary or inguinal lymphadenopathy. Neurological: Alert and oriented to person, place, and time. Cranial nerves are intact. Neurological exam is nonfocal  Skin: Skin is warm and dry. No rash noted. No erythema. No pallor. Psychiatric: Judgment normal.          Labs:  BMP:   No results for input(s): NA, K, CL, CO2, PHOS, BUN, CREATININE, CALCIUM in the last 72 hours.   CBC:   Recent Labs     12/21/20  1340   WBC 6.91   HGB 12.2   HCT 36.7   MCV 99.7*        PT/INR: No results for input(s): PROTIME, INR in the last 72 hours. APTT: No results for input(s): APTT in the last 72 hours. Magnesium:No results for input(s): MG in the last 72 hours. Phosphorus:No results for input(s): PHOS in the last 72 hours. Hepatic:   No results for input(s): ALKPHOS, ALT, AST, PROT, BILITOT, BILIDIR, LABALBU in the last 72 hours. Cultures:   No results for input(s): CULTURE in the last 72 hours. Radiology reports as per the Radiologist  Radiology: No results found. 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 She comes today accompanied by her daughter in follow-up. Last week Derrick Maria was seen at [facility] with abdominal pain. A CT scan of the abdomen and pelvis documented diverticulitis, she is currently on oral antibiotic. Examination is negative for evidence of palpable peripheral lymphadenopathy in the head and neck area, axilla or inguinal areas. Abdominal exam is benign without organomegaly, masses or ability to elicit the right upper quadrant or right flank tenderness. Lungs are clear. The heart is regular. The left anterior chest wall is negative for evidence of recurrence or new disease. The right breast exam is unremarkable and benign.      CT Chest with contrast at [facility] on 4/9/2019 documented:  · Stable CT appearance the chest without evidence of metastatic disease or acute cardiopulmonary process    CT Abd & Pelvis at [facility] on 4/9/2019 documented:  · No CT evidence of metastatic disease or acute intra-abdominal/pelvic pathological process. · Colonic diverticulosis    CT abdomen and pelvis with and without contrast on 12/14/2020 at [facility] documented:  · No definite evidence of metastatic disease in the abdomen or pelvis  · Question very subtle diverticulitis in the region of the sigmoid colon. · No acute findings in abdomen or pelvis  · Degenerative changes in the spine with an age-indeterminate compression fracture deformity at L4. This was not present on 2016's CT scan and correlation with a clinical scenario is needed. Serology on 12/16/2020:  CMP - Creat 1.03, GFR 52  LDH - 153  CA 15-3 =23.8  CEA = 3.1    CBC today (12/21/2020) reveals a WBC of 6.91. Hgb 12.2 with and MCV of 99.7 and a platelet count of 616,425. An extended discussion and conversation was undertaken with Matilde Garcia and her daughter. She only has mild discomfort in the abdomen on palpation today. This is apparently better than it had been last week when she was evaluated with diverticulitis. As mentioned, she is continuing on oral antibiotics treated elsewhere. I will go ahead and get repeat CT scans of the chest abdomen pelvis in 6 months prior to her return. Additionally, serology with a chemistry profile and LDH will be done at that time. No evidence of retroperitoneal or abdominal lymphadenopathy noted on the current CT scan to suggest recurrent disease. Ade Davalos underwent a left mastectomy May, 2001 for left breast DCIS, and she is monitored for this as well. Right breast mammogram 4/16/18 and ultrasound 4/19/18 revealed asymmetry in the right upper outer breast that improved on mammogram and did not correspond to sonographic abnormality. BI-RADS Category 3. Diagnostic right mammogram 10/24/2018 was a BI-RADS 2. As above, the physical examination of the left chest wall and right breast are unremarkable.   Bilateral mammograms will now be done in [place], arrangements are made accordingly    Serology 3/28/19:  CA 15-3 was 33.8 with a CEA of 1.7, both normal        #3 BMI: 23.11. Federal guidelines recommend that people under the age of 72 should have a BMI of 18.5-25 and people age 72 and older should have a BMI of 23-30. If yours is outside the range, we recommend you utilize a diet and exercise program to get yours into the range. We also recommend you speak with your primary care doctor should any specific advice be needed regarding special diets or programs which would be appropriate for your circumstances. #4   Bone Health     *no record of DEXA scan. This will be ordered to be done in [place] when she goes to get her mammograms done      $5   GI cancer screening     Cscope 08/10/18 - Jackie at [address] [facility] 4/18/16 - Jackie at [address] [place] [address] Avenue:  3. Arrange right diagnostic mammogram to be done in [facility] now that she is living with her daughter in that area  4. RTC 6 months with CMP, LDH and tumor markers and CT scans prior to return. Anca Nino am scribing for Neha Arias MD. Electronically signed by Oumar Mccarthy RN on 12/21/2020 at 3:03 PM    Bob Pemberton was seen today for follow-up. Diagnoses and all orders for this visit:    Personal history of breast cancer  -     CBC Auto Differential; Standing  -     CT ABDOMEN PELVIS W IV CONTRAST Additional Contrast? Radiologist Recommendation; Future  -     CT CHEST W CONTRAST; Future  -     Comprehensive Metabolic Panel; Future  -     CEA; Future  -     Cancer Antigen 15-3; Future  -     Lactate Dehydrogenase; Future  -     Mastectomy Bra MISC; by Does not apply route 1 year supply #6  -     DME Order for Prothesis as OP  -     TAYLOR DIGITAL SCREEN UNILATERAL RIGHT; Future    History of lymphoma  -     CBC Auto Differential; Standing  -     CT ABDOMEN PELVIS W IV CONTRAST Additional Contrast? Radiologist Recommendation; Future  -     CT CHEST W CONTRAST; Future  -     Comprehensive Metabolic Panel; Future  -     CEA;  Future  -     Cancer Breast       Orders Placed This Encounter   Medications    Mastectomy Bra MISC     Sig: by Does not apply route 1 year supply #6     Dispense:  1 each     Refill:  5         Return in about 6 months (around 6/21/2021) for follow-up w/ Dr. Rick Sims with labs prior to, CT scan(s) prior to. I, Dr. Daily Loaiza, personally performed the services described in this documentation as scribed by Raúl Minor LPN in my presence, and it is both accurate and complete.

## 2020-12-21 ENCOUNTER — HOSPITAL ENCOUNTER (OUTPATIENT)
Dept: INFUSION THERAPY | Age: 80
Discharge: HOME | End: 2020-12-21
Payer: MEDICARE

## 2020-12-21 ENCOUNTER — TRANSCRIBE ORDERS (OUTPATIENT)
Dept: ADMINISTRATIVE | Facility: HOSPITAL | Age: 80
End: 2020-12-21

## 2020-12-21 ENCOUNTER — OFFICE VISIT (OUTPATIENT)
Dept: HEMATOLOGY | Age: 80
End: 2020-12-21
Payer: MEDICARE

## 2020-12-21 VITALS
HEART RATE: 80 BPM | TEMPERATURE: 97.3 F | WEIGHT: 142 LBS | HEIGHT: 65 IN | DIASTOLIC BLOOD PRESSURE: 62 MMHG | SYSTOLIC BLOOD PRESSURE: 118 MMHG | BODY MASS INDEX: 23.66 KG/M2 | OXYGEN SATURATION: 96 %

## 2020-12-21 DIAGNOSIS — Z85.79 HISTORY OF LYMPHOMA: Primary | ICD-10-CM

## 2020-12-21 DIAGNOSIS — Z85.3 PERSONAL HISTORY OF BREAST CANCER: ICD-10-CM

## 2020-12-21 LAB
BASOPHILS ABSOLUTE: 0.03 K/UL (ref 0.01–0.08)
BASOPHILS RELATIVE PERCENT: 0.4 % (ref 0.1–1.2)
EOSINOPHILS ABSOLUTE: 0.22 K/UL (ref 0.04–0.54)
EOSINOPHILS RELATIVE PERCENT: 3.2 % (ref 0.7–7)
HCT VFR BLD CALC: 36.7 % (ref 34.1–44.9)
HEMOGLOBIN: 12.2 G/DL (ref 11.2–15.7)
LYMPHOCYTES ABSOLUTE: 2.22 K/UL (ref 1.18–3.74)
LYMPHOCYTES RELATIVE PERCENT: 32.1 % (ref 19.3–53.1)
MCH RBC QN AUTO: 33.2 PG (ref 25.6–32.2)
MCHC RBC AUTO-ENTMCNC: 33.2 G/DL (ref 32.3–35.5)
MCV RBC AUTO: 99.7 FL (ref 79.4–94.8)
MONOCYTES ABSOLUTE: 0.74 K/UL (ref 0.24–0.82)
MONOCYTES RELATIVE PERCENT: 10.7 % (ref 4.7–12.5)
NEUTROPHILS ABSOLUTE: 3.7 K/UL (ref 1.56–6.13)
NEUTROPHILS RELATIVE PERCENT: 53.6 % (ref 34–71.1)
PDW BLD-RTO: 12.8 % (ref 11.7–14.4)
PLATELET # BLD: 212 K/UL (ref 182–369)
PMV BLD AUTO: 11.1 FL (ref 7.4–10.4)
RBC # BLD: 3.68 M/UL (ref 3.93–5.22)
WBC # BLD: 6.91 K/UL (ref 3.98–10.04)

## 2020-12-21 PROCEDURE — 85025 COMPLETE CBC W/AUTO DIFF WBC: CPT

## 2020-12-21 PROCEDURE — 1090F PRES/ABSN URINE INCON ASSESS: CPT | Performed by: INTERNAL MEDICINE

## 2020-12-21 PROCEDURE — 1123F ACP DISCUSS/DSCN MKR DOCD: CPT | Performed by: INTERNAL MEDICINE

## 2020-12-21 PROCEDURE — 4040F PNEUMOC VAC/ADMIN/RCVD: CPT | Performed by: INTERNAL MEDICINE

## 2020-12-21 PROCEDURE — 1036F TOBACCO NON-USER: CPT | Performed by: INTERNAL MEDICINE

## 2020-12-21 PROCEDURE — G8427 DOCREV CUR MEDS BY ELIG CLIN: HCPCS | Performed by: INTERNAL MEDICINE

## 2020-12-21 PROCEDURE — G8400 PT W/DXA NO RESULTS DOC: HCPCS | Performed by: INTERNAL MEDICINE

## 2020-12-21 PROCEDURE — 99214 OFFICE O/P EST MOD 30 MIN: CPT | Performed by: INTERNAL MEDICINE

## 2020-12-21 PROCEDURE — G8484 FLU IMMUNIZE NO ADMIN: HCPCS | Performed by: INTERNAL MEDICINE

## 2020-12-21 PROCEDURE — G8420 CALC BMI NORM PARAMETERS: HCPCS | Performed by: INTERNAL MEDICINE

## 2020-12-21 PROCEDURE — 99213 OFFICE O/P EST LOW 20 MIN: CPT

## 2020-12-21 RX ORDER — ALPRAZOLAM 0.5 MG/1
TABLET ORAL
COMMUNITY
End: 2020-12-21 | Stop reason: ALTCHOICE

## 2020-12-21 RX ORDER — CLOBETASOL PROPIONATE 0.5 MG/G
CREAM TOPICAL
COMMUNITY
End: 2020-12-21 | Stop reason: ALTCHOICE

## 2020-12-21 RX ORDER — BETAMETHASONE VALERATE 0.1 %
LOTION (ML) TOPICAL
COMMUNITY
End: 2020-12-21 | Stop reason: ALTCHOICE

## 2020-12-21 RX ORDER — CYCLOSPORINE 0.5 MG/ML
EMULSION OPHTHALMIC
COMMUNITY

## 2020-12-21 RX ORDER — ATORVASTATIN CALCIUM 10 MG/1
TABLET, FILM COATED ORAL
COMMUNITY
End: 2020-12-21 | Stop reason: ALTCHOICE

## 2020-12-21 RX ORDER — LANOLIN ALCOHOL/MO/W.PET/CERES
1000 CREAM (GRAM) TOPICAL DAILY
COMMUNITY

## 2020-12-22 ENCOUNTER — TELEPHONE (OUTPATIENT)
Dept: HEMATOLOGY | Age: 80
End: 2020-12-22

## 2020-12-22 NOTE — TELEPHONE ENCOUNTER
Called and let patient know of Mammogram and Dexa scan at [facility] on 01/14/2021 at 10:20 am and bone density at 11 am.

## 2021-06-03 DIAGNOSIS — Z85.3 PERSONAL HISTORY OF BREAST CANCER: Primary | ICD-10-CM

## 2021-06-14 ENCOUNTER — HOSPITAL ENCOUNTER (OUTPATIENT)
Dept: CT IMAGING | Facility: HOSPITAL | Age: 81
End: 2021-06-14

## 2021-06-14 ENCOUNTER — HOSPITAL ENCOUNTER (OUTPATIENT)
Dept: INFUSION THERAPY | Age: 81
End: 2021-06-14
Payer: MEDICARE

## 2021-07-19 ENCOUNTER — TELEPHONE (OUTPATIENT)
Dept: INFUSION THERAPY | Age: 81
End: 2021-07-19

## 2021-07-19 NOTE — TELEPHONE ENCOUNTER
PT called stating that she is having dark urine, jaundiced skin and skin itching. This RN spoke with PT.  PT states that she is out of town in [facility] at her daughter's house for the week. PT urged to go to ER by this RN to be evaluated due to symptoms. PT verbalizes understanding and states she will go to ER tonight.

## 2021-09-15 ENCOUNTER — TELEPHONE (OUTPATIENT)
Dept: INFUSION THERAPY | Age: 81
End: 2021-09-15

## 2021-09-15 NOTE — TELEPHONE ENCOUNTER
Heriberto Curry called to cancel apt with Dr. Dee Downs next week, she has been in [place] for two months for emergency surgery for Pancreatic cancer and is now just getting home. She will call back when she is ready for an apt. Elizabeth Wang.  Wendy Baig